# Patient Record
Sex: MALE | Race: BLACK OR AFRICAN AMERICAN | URBAN - METROPOLITAN AREA
[De-identification: names, ages, dates, MRNs, and addresses within clinical notes are randomized per-mention and may not be internally consistent; named-entity substitution may affect disease eponyms.]

---

## 2017-09-14 ENCOUNTER — TRANSFERRED RECORDS (OUTPATIENT)
Dept: HEALTH INFORMATION MANAGEMENT | Facility: CLINIC | Age: 32
End: 2017-09-14

## 2018-02-08 NOTE — TELEPHONE ENCOUNTER
ACTION    What did you do? Faxed request to [facility] to push imaging

## 2018-02-08 NOTE — TELEPHONE ENCOUNTER
Received Imaging From: [facility]    Image Type (x): Disc:___  Pacs:__x_      Exam Date/Name: See previous note Comments: Notified Mila to pull

## 2018-02-08 NOTE — TELEPHONE ENCOUNTER
APPT INFO    Date /Time: 2/16/18 3PM   Reason for Appt: Lower Back Pain   Ref Provider/Clinic: Self-referred   Are there internal records? Yes/No?  IF YES, list clinic names: no   Are there outside records? Yes/No? Yes - see below   Patient Contact (Y/N) & Call Details: no   Action: CareEverywhere records reviewed. See CareEverywhere Tab.       OUTSIDE RECORDS CHECKLIST     CLINIC NAME COMMENTS REC (x) IMG (x)   [facility]    (Care Everywhere) OFFICE NOTES: 9/6/17, 9/13/17, 10/11/17, 11/8/17, 11/29/17, 11/30/17, 12/4/17, 1/4/18    RADIOLOGY:   XR L Spine 10/11/17  CT L Spine 6/26/17  CT T Spine 6/26/17  CT C Spine 6/26/17       x x

## 2018-02-16 ENCOUNTER — PRE VISIT (OUTPATIENT)
Dept: ORTHOPEDICS | Facility: CLINIC | Age: 33
End: 2018-02-16

## 2018-03-15 ENCOUNTER — TRANSFERRED RECORDS (OUTPATIENT)
Dept: HEALTH INFORMATION MANAGEMENT | Facility: CLINIC | Age: 33
End: 2018-03-15

## 2018-05-09 ENCOUNTER — TRANSFERRED RECORDS (OUTPATIENT)
Dept: HEALTH INFORMATION MANAGEMENT | Facility: CLINIC | Age: 33
End: 2018-05-09

## 2018-05-14 ENCOUNTER — TRANSFERRED RECORDS (OUTPATIENT)
Dept: HEALTH INFORMATION MANAGEMENT | Facility: CLINIC | Age: 33
End: 2018-05-14

## 2018-08-13 ENCOUNTER — PRE VISIT (OUTPATIENT)
Dept: GASTROENTEROLOGY | Facility: CLINIC | Age: 33
End: 2018-08-13

## 2018-08-13 NOTE — PROGRESS NOTES
Was the patient contacted by phone and reminded of the upcoming visit? message left via [employer]      Was the patient instructed to bring a current list of all medications to the appointment or instructed to bring in all medication bottles? Yes     Were outside records requested? Message left to hand carry outside records with to appointment. Pt has never been seen at , Care Everywhere [facility] updated    Was the patient instructed to arrive prior to the appointment time to have ordered labs drawn? Yes     Were the needed lab orders placed? Yes    Chelsey Green [facility]  8/13/2018 12:23 PM

## 2018-08-14 DIAGNOSIS — Z76.89 ENCOUNTER TO ESTABLISH CARE: Primary | ICD-10-CM

## 2018-08-15 DIAGNOSIS — Z76.89 ENCOUNTER TO ESTABLISH CARE: ICD-10-CM

## 2018-08-15 PROBLEM — K76.0 FATTY LIVER: Status: ACTIVE | Noted: 2018-08-15

## 2018-08-15 LAB
ALBUMIN SERPL-MCNC: 4.3 G/DL (ref 3.4–5)
ALP SERPL-CCNC: 94 U/L (ref 40–150)
ALT SERPL W P-5'-P-CCNC: 61 U/L (ref 0–70)
ANION GAP SERPL CALCULATED.3IONS-SCNC: 7 MMOL/L (ref 3–14)
AST SERPL W P-5'-P-CCNC: 34 U/L (ref 0–45)
BILIRUB DIRECT SERPL-MCNC: 0.2 MG/DL (ref 0–0.2)
BILIRUB SERPL-MCNC: 0.7 MG/DL (ref 0.2–1.3)
BUN SERPL-MCNC: 6 MG/DL (ref 7–30)
CALCIUM SERPL-MCNC: 9.3 MG/DL (ref 8.5–10.1)
CHLORIDE SERPL-SCNC: 102 MMOL/L (ref 94–109)
CO2 SERPL-SCNC: 27 MMOL/L (ref 20–32)
CREAT SERPL-MCNC: 0.84 MG/DL (ref 0.66–1.25)
ERYTHROCYTE [DISTWIDTH] IN BLOOD BY AUTOMATED COUNT: 14.1 % (ref 10–15)
GFR SERPL CREATININE-BSD FRML MDRD: >90 ML/MIN/1.7M2
GLUCOSE SERPL-MCNC: 91 MG/DL (ref 70–99)
HBV CORE AB SERPL QL IA: NONREACTIVE
HBV SURFACE AB SERPL IA-ACNC: >1000 M[IU]/ML
HBV SURFACE AG SERPL QL IA: NONREACTIVE
HCT VFR BLD AUTO: 48 % (ref 40–53)
HCV AB SERPL QL IA: NONREACTIVE
HGB BLD-MCNC: 15.9 G/DL (ref 13.3–17.7)
INR PPP: 1.12 (ref 0.86–1.14)
MCH RBC QN AUTO: 30.7 PG (ref 26.5–33)
MCHC RBC AUTO-ENTMCNC: 33.1 G/DL (ref 31.5–36.5)
MCV RBC AUTO: 93 FL (ref 78–100)
PLATELET # BLD AUTO: 381 10E9/L (ref 150–450)
POTASSIUM SERPL-SCNC: 4.4 MMOL/L (ref 3.4–5.3)
PROT SERPL-MCNC: 8.6 G/DL (ref 6.8–8.8)
RBC # BLD AUTO: 5.18 10E12/L (ref 4.4–5.9)
SODIUM SERPL-SCNC: 137 MMOL/L (ref 133–144)
WBC # BLD AUTO: 7.2 10E9/L (ref 4–11)

## 2018-08-15 PROCEDURE — 87340 HEPATITIS B SURFACE AG IA: CPT | Performed by: INTERNAL MEDICINE

## 2018-08-15 PROCEDURE — 86704 HEP B CORE ANTIBODY TOTAL: CPT | Performed by: INTERNAL MEDICINE

## 2018-08-15 PROCEDURE — T1013 SIGN LANG/ORAL INTERPRETER: HCPCS | Mod: U3

## 2018-08-15 PROCEDURE — 36415 COLL VENOUS BLD VENIPUNCTURE: CPT | Performed by: INTERNAL MEDICINE

## 2018-08-15 PROCEDURE — 86803 HEPATITIS C AB TEST: CPT | Performed by: INTERNAL MEDICINE

## 2018-08-15 PROCEDURE — 86706 HEP B SURFACE ANTIBODY: CPT | Performed by: INTERNAL MEDICINE

## 2018-08-16 ENCOUNTER — RADIANT APPOINTMENT (OUTPATIENT)
Dept: ULTRASOUND IMAGING | Facility: CLINIC | Age: 33
End: 2018-08-16
Attending: INTERNAL MEDICINE
Payer: COMMERCIAL

## 2018-08-16 DIAGNOSIS — K76.0 FATTY LIVER: ICD-10-CM

## 2018-10-31 DIAGNOSIS — K76.0 FATTY LIVER: Primary | ICD-10-CM

## 2018-11-21 ENCOUNTER — OFFICE VISIT (OUTPATIENT)
Dept: GASTROENTEROLOGY | Facility: CLINIC | Age: 33
End: 2018-11-21
Attending: INTERNAL MEDICINE
Payer: COMMERCIAL

## 2018-11-21 VITALS
OXYGEN SATURATION: 99 % | TEMPERATURE: 98.1 F | SYSTOLIC BLOOD PRESSURE: 131 MMHG | BODY MASS INDEX: 24.11 KG/M2 | DIASTOLIC BLOOD PRESSURE: 84 MMHG | HEART RATE: 79 BPM | WEIGHT: 168 LBS

## 2018-11-21 DIAGNOSIS — K76.0 NAFL (NONALCOHOLIC FATTY LIVER): Primary | ICD-10-CM

## 2018-11-21 DIAGNOSIS — K76.0 FATTY LIVER: ICD-10-CM

## 2018-11-21 LAB
ALBUMIN SERPL-MCNC: 4.1 G/DL (ref 3.4–5)
ALP SERPL-CCNC: 88 U/L (ref 40–150)
ALT SERPL W P-5'-P-CCNC: 50 U/L (ref 0–70)
ANION GAP SERPL CALCULATED.3IONS-SCNC: 8 MMOL/L (ref 3–14)
AST SERPL W P-5'-P-CCNC: 23 U/L (ref 0–45)
BILIRUB DIRECT SERPL-MCNC: 0.1 MG/DL (ref 0–0.2)
BILIRUB SERPL-MCNC: 0.5 MG/DL (ref 0.2–1.3)
BUN SERPL-MCNC: 9 MG/DL (ref 7–30)
CALCIUM SERPL-MCNC: 8.7 MG/DL (ref 8.5–10.1)
CHLORIDE SERPL-SCNC: 103 MMOL/L (ref 94–109)
CO2 SERPL-SCNC: 26 MMOL/L (ref 20–32)
CREAT SERPL-MCNC: 0.82 MG/DL (ref 0.66–1.25)
ERYTHROCYTE [DISTWIDTH] IN BLOOD BY AUTOMATED COUNT: 13.5 % (ref 10–15)
GFR SERPL CREATININE-BSD FRML MDRD: >90 ML/MIN/1.7M2
GLUCOSE SERPL-MCNC: 94 MG/DL (ref 70–99)
HCT VFR BLD AUTO: 44.2 % (ref 40–53)
HGB BLD-MCNC: 15 G/DL (ref 13.3–17.7)
INR PPP: 1.04 (ref 0.86–1.14)
MCH RBC QN AUTO: 30.7 PG (ref 26.5–33)
MCHC RBC AUTO-ENTMCNC: 33.9 G/DL (ref 31.5–36.5)
MCV RBC AUTO: 90 FL (ref 78–100)
PLATELET # BLD AUTO: 360 10E9/L (ref 150–450)
POTASSIUM SERPL-SCNC: 4 MMOL/L (ref 3.4–5.3)
PROT SERPL-MCNC: 8.3 G/DL (ref 6.8–8.8)
RBC # BLD AUTO: 4.89 10E12/L (ref 4.4–5.9)
SODIUM SERPL-SCNC: 137 MMOL/L (ref 133–144)
WBC # BLD AUTO: 6.4 10E9/L (ref 4–11)

## 2018-11-21 PROCEDURE — 85027 COMPLETE CBC AUTOMATED: CPT | Performed by: INTERNAL MEDICINE

## 2018-11-21 PROCEDURE — G0463 HOSPITAL OUTPT CLINIC VISIT: HCPCS | Mod: ZF

## 2018-11-21 PROCEDURE — T1013 SIGN LANG/ORAL INTERPRETER: HCPCS | Mod: U3

## 2018-11-21 PROCEDURE — 85610 PROTHROMBIN TIME: CPT | Performed by: INTERNAL MEDICINE

## 2018-11-21 PROCEDURE — 80048 BASIC METABOLIC PNL TOTAL CA: CPT | Performed by: INTERNAL MEDICINE

## 2018-11-21 PROCEDURE — 80076 HEPATIC FUNCTION PANEL: CPT | Performed by: INTERNAL MEDICINE

## 2018-11-21 PROCEDURE — 36415 COLL VENOUS BLD VENIPUNCTURE: CPT | Performed by: INTERNAL MEDICINE

## 2018-11-21 ASSESSMENT — PAIN SCALES - GENERAL: PAINLEVEL: MILD PAIN (2)

## 2018-11-21 NOTE — LETTER
11/21/2018      RE: Edgar Dias  [address]  [facility] [address]       GI CLINIC VISIT    CC/REFERRING PROVIDER: Referred Self  REASON FOR CONSULTATION: Follow up     HPI: 33 year old male ([place] immigrant) with a history of chronic back pain, seen in the GI clinic for fatty infiltration of the liver on imaging studies.  Patient's was seen 08/2018, and at that time did not have any symptoms of jaundice, abdominal pain, fluid retention, or pedal edema.  Patient had noted that his weight had gone [place].  He had undergone hepatitis viral screening which are negative.  Today he is seen for follow-up, without any nausea, vomiting, abdominal pain, jaundice, confusion, hematemesis, melena, coffee-ground vomiting or dysphagia.  He continues to have good bowel movements, and is doing well.  He has no other problems to discuss today.    ROS: 10pt ROS performed and otherwise negative.    PERTINENT PAST MEDICAL/SURGICAL HISTORY:  Past Medical History:   Diagnosis Date     Chronic back pain      Hyperlipemia      NAFL (nonalcoholic fatty liver)      PERTINENT MEDICATIONS:  Current Outpatient Prescriptions:      traMADol (ULTRAM) 50 MG tablet, Take 50 mg by mouth every 8 hours as needed for pain, Disp: , Rfl:      UNABLE TO FIND, MEDICATION NAME: Cholesterol medication daily, Disp: , Rfl:      PHYSICAL EXAMINATION:  Vitals reviewed  /84  Pulse 79  Temp 98.1  F (36.7  C) (Oral)  Wt 76.2 kg (168 lb)  SpO2 99%  BMI 24.11 kg/m2  Wt Readings from Last 2 Encounters:   11/21/18 76.2 kg (168 lb)   08/15/18 75.8 kg (167 lb 3.2 oz)     Gen: aaox3, cooperative, pleasant, not diaphoretic, nad  HEENT: ncat, neck supple, no clad/sclad, normal op w/o ulcer/exudate, anicteric, mmm  Resp/CV without acute findings, not dyspneic/tachycardic  Abd: Soft, moves with respiration, nontender, nondistended.  Ext: no c/c/e  Skin: warm, perfused, no jaundice  Neuro: grossly intact, no asterixis  noted    PERTINENT STUDIES:  BMP entirely normal, ALT 50, AST 23, alkaline phosphatase 88, bilirubin 0.5.  Platelet count is 360, albumin 4.1, INR 1.04.     ASSESSMENT/PLAN:  Mr. Dias is a 53-year-old male, seen in the hepatology clinic for fatty infiltration of the liver on imaging studies.    1.  Fatty liver  Edgar is doing well, without any evidence of cirrhosis both on imaging studies and lab work.  He does not have any clinical signs of chronic liver disease or any evidence of portal hypertension.  He is doing well, with normal transaminases, bilirubin, BMP, albumin and INR.  Viral hepatitis panel suggests immunity to hepatitis B.  At this time, no additional intervention is needed, he does not require any follow-up from hepatology clinic unless there are new questions, or he develops cirrhosis.  Counseled him on healthy weight, diet, and activity.  -- Ensure stable weight  -- Continue to eat healthy and exercise regularly    Discussed this with patient today, and he agrees with plan.  We will discharge him to his primary care provider for continued follow-up.     Colorectal Cancer Screening  In 18 years    Discharge from hepatology clinic.    The visit lasted up to 30 minutes, with more than half of the time spent on counseling and education.  All questions were answered to patient's satisfaction    Patient seen and discussed with staff GI physician, Dr. Myrick, who agrees with my assessment and plan.      Sepideh Hughes MD  Gastroenterology fellow  PGY 4  [phone]   Attestation:  This patient has been seen and evaluated by me, Everett Myrick.  Discussed with the house staff team or resident(s) and agree with the findings and plan in this note.       Sepideh Hughes MD

## 2018-11-21 NOTE — NURSING NOTE
Chief Complaint   Patient presents with     RECHECK     fatty liver     /84  Pulse 79  Temp 98.1  F (36.7  C) (Oral)  Wt 76.2 kg (168 lb)  SpO2 99%  BMI 24.11 kg/m2  Ruth Poole MA

## 2018-11-21 NOTE — LETTER
Date:December 3, 2018      Patient was self referred, no letter generated. Do not send.        [facility] Health Information

## 2018-11-21 NOTE — MR AVS SNAPSHOT
"              After Visit Summary   2018    Edgar Dias    MRN: [id number]           Patient Information     Date Of Birth          1985        Visit Information        Provider Department      2018 9:45 AM Jodi Velez Chimaobi Michael, MD [facility] Hepatology        Care Instructions    Discharge to PCP. No follow up required.           Follow-ups after your visit        Who to contact     If you have questions or need follow up information about today's clinic visit or your schedule please contact [facility] HEPATOLOGY directly at [phone].  Normal or non-critical lab and imaging results will be communicated to you by [employer], letter or phone within 4 business days after the clinic has received the results. If you do not hear from us within 7 days, please contact the clinic through [employer] or phone. If you have a critical or abnormal lab result, we will notify you by phone as soon as possible.  Submit refill requests through [employer] or call your pharmacy and they will forward the refill request to us. Please allow 3 business days for your refill to be completed.          Additional Information About Your Visit        MyChart Information     [employer] lets you send messages to your doctor, view your test results, renew your prescriptions, schedule appointments and more. To sign up, go to [email or web address] . Click on \"Log in\" on the left side of the screen, which will take you to the Welcome page. Then click on \"Sign up Now\" on the right side of the page.     You will be asked to enter the access code listed below, as well as some personal information. Please follow the directions to create your username and password.     Your access code is: [email or web address]  Expires: 2019  6:31 AM     Your access code will  in 90 days. If you need help or a new code, please call your [facility] clinic or [phone].        Care EveryWhere ID     This is your Care EveryWhere ID. This " could be used by other organizations to access your [place] medical records  [id number]        Your Vitals Were     Pulse Temperature Pulse Oximetry BMI (Body Mass Index)          79 98.1  F (36.7  C) (Oral) 99% 24.11 kg/m2         Blood Pressure from Last 3 Encounters:   11/21/18 131/84   08/15/18 135/82    Weight from Last 3 Encounters:   11/21/18 76.2 kg (168 lb)   08/15/18 75.8 kg (167 lb 3.2 oz)              Today, you had the following     No orders found for display       Primary Care Provider    None Specified       No primary provider on file.        Equal Access to Services     [facility]: Hadii ellen Del Cid, gabriella schneider, nish morocho, roscoe arzate . So [facility] [phone].    ATENCIÓN: Si habla español, tiene a cassidy disposición servicios gratuitos de asistencia lingüística. Llame al [phone].    We comply with applicable federal civil rights laws and [place] laws. We do not discriminate on the basis of race, color, national origin, age, disability, sex, sexual orientation, or gender identity.            Thank you!     Thank you for choosing [facility] HEPATOLOGY  for your care. Our goal is always to provide you with excellent care. Hearing back from our patients is one way we can continue to improve our services. Please take a few minutes to complete the written survey that you may receive in the mail after your visit with us. Thank you!             Your Updated Medication List - Protect others around you: Learn how to safely use, store and throw away your medicines at www.disposemymeds.org.          This list is accurate as of 11/21/18 10:37 AM.  Always use your most recent med list.                   Brand Name Dispense Instructions for use Diagnosis    traMADol 50 MG tablet    ULTRAM     Take 50 mg by mouth every 8 hours as needed for pain    Fatty liver       UNABLE TO FIND      MEDICATION NAME: Cholesterol medication daily    Fatty liver

## 2018-11-23 NOTE — PROGRESS NOTES
GI CLINIC VISIT    CC/REFERRING PROVIDER: Referred Self  REASON FOR CONSULTATION: Follow up     HPI: 33 year old male ([place] immigrant) with a history of chronic back pain, seen in the GI clinic for fatty infiltration of the liver on imaging studies.  Patient's was seen 08/2018, and at that time did not have any symptoms of jaundice, abdominal pain, fluid retention, or pedal edema.  Patient had noted that his weight had gone up since [place] the country.  He had undergone hepatitis viral screening which are negative.  Today he is seen for follow-up, without any nausea, vomiting, abdominal pain, jaundice, confusion, hematemesis, melena, coffee-ground vomiting or dysphagia.  He continues to have good bowel movements, and is doing well.  He has no other problems to discuss today.    ROS: 10pt ROS performed and otherwise negative.    PERTINENT PAST MEDICAL/SURGICAL HISTORY:  Past Medical History:   Diagnosis Date     Chronic back pain      Hyperlipemia      NAFL (nonalcoholic fatty liver)      PERTINENT MEDICATIONS:  Current Outpatient Prescriptions:      traMADol (ULTRAM) 50 MG tablet, Take 50 mg by mouth every 8 hours as needed for pain, Disp: , Rfl:      UNABLE TO FIND, MEDICATION NAME: Cholesterol medication daily, Disp: , Rfl:      PHYSICAL EXAMINATION:  Vitals reviewed  /84  Pulse 79  Temp 98.1  F (36.7  C) (Oral)  Wt 76.2 kg (168 lb)  SpO2 99%  BMI 24.11 kg/m2  Wt Readings from Last 2 Encounters:   11/21/18 76.2 kg (168 lb)   08/15/18 75.8 kg (167 lb 3.2 oz)     Gen: aaox3, cooperative, pleasant, not diaphoretic, nad  HEENT: ncat, neck supple, no clad/sclad, normal op w/o ulcer/exudate, anicteric, mmm  Resp/CV without acute findings, not dyspneic/tachycardic  Abd: Soft, moves with respiration, nontender, nondistended.  Ext: no c/c/e  Skin: warm, perfused, no jaundice  Neuro: grossly intact, no asterixis noted    PERTINENT STUDIES:  BMP entirely normal, ALT 50, AST 23, alkaline phosphatase 88,  bilirubin 0.5.  Platelet count is 360, albumin 4.1, INR 1.04.     ASSESSMENT/PLAN:  Mr. Dias is a 53-year-old male, seen in the hepatology clinic for fatty infiltration of the liver on imaging studies.    1.  Fatty liver  Edgar is doing well, without any evidence of cirrhosis both on imaging studies and lab work.  He does not have any clinical signs of chronic liver disease or any evidence of portal hypertension.  He is doing well, with normal transaminases, bilirubin, BMP, albumin and INR.  Viral hepatitis panel suggests immunity to hepatitis B.  At this time, no additional intervention is needed, he does not require any follow-up from hepatology clinic unless there are new questions, or he develops cirrhosis.  Counseled him on healthy weight, diet, and activity.  -- Ensure stable weight  -- Continue to eat healthy and exercise regularly    Discussed this with patient today, and he agrees with plan.  We will discharge him to his primary care provider for continued follow-up.     Colorectal Cancer Screening  In 18 years    Discharge from hepatology clinic.    The visit lasted up to 30 minutes, with more than half of the time spent on counseling and education.  All questions were answered to patient's satisfaction    Patient seen and discussed with staff GI physician, Dr. Myrick, who agrees with my assessment and plan.      Sepideh Hughes MD  Gastroenterology fellow  PGY 4  [phone]   Attestation:  This patient has been seen and evaluated by me, Everett Myrick.  Discussed with the house staff team or resident(s) and agree with the findings and plan in this note.